# Patient Record
Sex: FEMALE | Race: WHITE | NOT HISPANIC OR LATINO | URBAN - METROPOLITAN AREA
[De-identification: names, ages, dates, MRNs, and addresses within clinical notes are randomized per-mention and may not be internally consistent; named-entity substitution may affect disease eponyms.]

---

## 2017-04-10 NOTE — PATIENT DISCUSSION
(H25.013) Cortical age-related cataract, bilateral - Assesment : Examination revealed Cortical Senile Cataract. Patient currently has mild symptoms but functioning well. - Plan : Monitor for changes. Advised patient to call our office with decreased vision or increased symptoms.  RV 1 year Exam.

## 2017-04-10 NOTE — PATIENT DISCUSSION
(H35.885) Drusen (degenerative) of macula, bilateral - Assesment : Examination revealed Drusen OU. OD - pigment dropout - stable, Drusen OS - stable. - Plan : Monitor for changes. Advised patient to call our office with decreased vision or increased symptoms.

## 2017-04-10 NOTE — PATIENT DISCUSSION
(H35.61) Retinal hemorrhage, right eye - Assesment : Examination revealed a solitary dot blot hemorrhage OD. No hx of diabetes or htn. - Plan : Recommend monitor and reassess next year.

## 2018-05-17 NOTE — PATIENT DISCUSSION
(H34.211) Partial retinal artery occlusion, right eye - Assesment : Examination revealed Hollenhorst plaque OD. No bruit appreciated. - Plan : Recommend a  carotid workup for evaluation.

## 2018-05-17 NOTE — PATIENT DISCUSSION
(O92.893) Keratoconjunct sicca, not specified as Sjogren's, bilateral - Assesment : Examination revealed Dry Eye Syndrome OU. - Plan : Monitor for changes. Patient to use Artificial Tears 3-4 times daily.   RTC 1 year

## 2020-08-26 ENCOUNTER — IMPORTED ENCOUNTER (OUTPATIENT)
Dept: URBAN - METROPOLITAN AREA CLINIC 1 | Facility: CLINIC | Age: 65
End: 2020-08-26

## 2020-08-26 PROBLEM — H25.813: Noted: 2020-08-26

## 2020-08-26 PROBLEM — H10.45: Noted: 2020-08-26

## 2020-08-26 PROBLEM — H04.123: Noted: 2020-08-26

## 2020-08-26 PROCEDURE — 92004 COMPRE OPH EXAM NEW PT 1/>: CPT

## 2020-08-26 NOTE — PATIENT DISCUSSION
1.  Allergic Conjunctivitis OU -- Pt takes Olopatadine BID OU. Condition discussed with patient. 2.  Dry Eyes OU -- The use of ATs recommended BID OU (Sample of Refresh Relieva given). 3.  Cataract OU -- Observe. Return for an appointment in 1 year 27 with Dr. Phoenix Morris.

## 2022-03-08 ENCOUNTER — EMERGENCY VISIT (OUTPATIENT)
Dept: URBAN - METROPOLITAN AREA CLINIC 1 | Facility: CLINIC | Age: 67
End: 2022-03-08

## 2022-03-08 DIAGNOSIS — H04.123: ICD-10-CM

## 2022-03-08 DIAGNOSIS — H25.813: ICD-10-CM

## 2022-03-08 DIAGNOSIS — H10.45: ICD-10-CM

## 2022-03-08 PROCEDURE — 92012 INTRM OPH EXAM EST PATIENT: CPT

## 2022-03-08 RX ORDER — PREDNISOLONE ACETATE 10 MG/ML: 1 SUSPENSION/ DROPS OPHTHALMIC

## 2022-03-08 ASSESSMENT — VISUAL ACUITY
OS_SC: 20/25
OD_SC: 20/25
OD_SC: J1
OS_SC: J1

## 2022-03-08 ASSESSMENT — TONOMETRY
OS_IOP_MMHG: 15
OD_IOP_MMHG: 15

## 2022-03-18 ENCOUNTER — EMERGENCY VISIT (OUTPATIENT)
Dept: URBAN - METROPOLITAN AREA CLINIC 1 | Facility: CLINIC | Age: 67
End: 2022-03-18

## 2022-03-18 DIAGNOSIS — H10.45: ICD-10-CM

## 2022-03-18 DIAGNOSIS — H04.123: ICD-10-CM

## 2022-03-18 DIAGNOSIS — H25.813: ICD-10-CM

## 2022-03-18 PROCEDURE — 92012 INTRM OPH EXAM EST PATIENT: CPT

## 2022-03-18 RX ORDER — OLOPATADINE HYDROCHLORIDE 2 MG/ML: 1 SOLUTION OPHTHALMIC ONCE A DAY

## 2022-03-18 RX ORDER — CYCLOSPORINE 0.5 MG/ML: 1 EMULSION OPHTHALMIC TWICE A DAY

## 2022-03-18 ASSESSMENT — TONOMETRY
OS_IOP_MMHG: 20
OD_IOP_MMHG: 20

## 2022-03-18 ASSESSMENT — VISUAL ACUITY
OS_CC: 20/25
OD_CC: 20/30

## 2022-03-18 NOTE — PATIENT DISCUSSION
The risks, benefits, and alternatives to surgery were discussed. The patient elects to proceed with surgery. Will refer patient Oculoplastics.

## 2022-03-18 NOTE — PATIENT DISCUSSION
ОЛЬГА noted OU today despite compliance with PF ATs. Will begin Restasis BID OU (erx'd). In addition compliance with OTC PF ATs Q2H OU. Recommend instilling AT immediately prior to Restasis.

## 2022-03-18 NOTE — PATIENT DISCUSSION
Cool compresses. Cont PO meds for allergy as directed by Allergist. OK to use OTC Pataday Qdaily OU.

## 2022-04-02 ASSESSMENT — VISUAL ACUITY
OD_SC: 20/25
OS_SC: 20/20
OD_CC: J1
OS_CC: J1

## 2022-04-02 ASSESSMENT — TONOMETRY
OD_IOP_MMHG: 15
OS_IOP_MMHG: 14

## 2022-05-10 ENCOUNTER — COMPREHENSIVE EXAM (OUTPATIENT)
Dept: URBAN - METROPOLITAN AREA CLINIC 1 | Facility: CLINIC | Age: 67
End: 2022-05-10

## 2022-05-10 DIAGNOSIS — H04.123: ICD-10-CM

## 2022-05-10 DIAGNOSIS — H25.813: ICD-10-CM

## 2022-05-10 DIAGNOSIS — H40.033: ICD-10-CM

## 2022-05-10 PROCEDURE — 99214 OFFICE O/P EST MOD 30 MIN: CPT

## 2022-05-10 ASSESSMENT — VISUAL ACUITY
OS_CC: 20/20
OS_BAT: 20/70
OD_CC: 20/30
OD_BAT: 20/70

## 2022-05-10 ASSESSMENT — TONOMETRY
OD_IOP_MMHG: 17
OS_IOP_MMHG: 15

## 2022-05-10 NOTE — PATIENT DISCUSSION
Visually significant second to glare. The patient feels that the cataract is significantly impacting daily activities and has elected cataract surgery. The risks, benefits, and alternatives to surgery were discussed. The patient elects to proceed with surgery.  Phaco OS then OD.  Will defer Yag PI second to scheduling Phaco.,.